# Patient Record
Sex: FEMALE
[De-identification: names, ages, dates, MRNs, and addresses within clinical notes are randomized per-mention and may not be internally consistent; named-entity substitution may affect disease eponyms.]

---

## 2018-01-03 ENCOUNTER — TRANSCRIPTION ENCOUNTER (OUTPATIENT)
Age: 23
End: 2018-01-03

## 2018-05-24 ENCOUNTER — TRANSCRIPTION ENCOUNTER (OUTPATIENT)
Age: 23
End: 2018-05-24

## 2018-06-13 PROBLEM — Z00.00 ENCOUNTER FOR PREVENTIVE HEALTH EXAMINATION: Status: ACTIVE | Noted: 2018-06-13

## 2018-06-14 ENCOUNTER — ASOB RESULT (OUTPATIENT)
Age: 23
End: 2018-06-14

## 2018-06-14 ENCOUNTER — APPOINTMENT (OUTPATIENT)
Dept: ANTEPARTUM | Facility: CLINIC | Age: 23
End: 2018-06-14
Payer: COMMERCIAL

## 2018-06-14 PROCEDURE — 76813 OB US NUCHAL MEAS 1 GEST: CPT

## 2018-08-16 ENCOUNTER — ASOB RESULT (OUTPATIENT)
Age: 23
End: 2018-08-16

## 2018-08-16 ENCOUNTER — APPOINTMENT (OUTPATIENT)
Dept: ANTEPARTUM | Facility: CLINIC | Age: 23
End: 2018-08-16
Payer: COMMERCIAL

## 2018-08-16 PROCEDURE — 76805 OB US >/= 14 WKS SNGL FETUS: CPT

## 2018-08-29 ENCOUNTER — ASOB RESULT (OUTPATIENT)
Age: 23
End: 2018-08-29

## 2018-08-29 ENCOUNTER — APPOINTMENT (OUTPATIENT)
Dept: ANTEPARTUM | Facility: CLINIC | Age: 23
End: 2018-08-29
Payer: COMMERCIAL

## 2018-08-29 PROCEDURE — 76816 OB US FOLLOW-UP PER FETUS: CPT

## 2018-10-10 ENCOUNTER — ASOB RESULT (OUTPATIENT)
Age: 23
End: 2018-10-10

## 2018-10-10 ENCOUNTER — APPOINTMENT (OUTPATIENT)
Dept: ANTEPARTUM | Facility: CLINIC | Age: 23
End: 2018-10-10
Payer: COMMERCIAL

## 2018-10-10 PROCEDURE — 76819 FETAL BIOPHYS PROFIL W/O NST: CPT

## 2018-10-10 PROCEDURE — 76816 OB US FOLLOW-UP PER FETUS: CPT

## 2018-11-28 ENCOUNTER — APPOINTMENT (OUTPATIENT)
Dept: ANTEPARTUM | Facility: CLINIC | Age: 23
End: 2018-11-28
Payer: COMMERCIAL

## 2018-11-28 ENCOUNTER — ASOB RESULT (OUTPATIENT)
Age: 23
End: 2018-11-28

## 2018-11-28 PROCEDURE — 76816 OB US FOLLOW-UP PER FETUS: CPT

## 2018-11-28 PROCEDURE — 76819 FETAL BIOPHYS PROFIL W/O NST: CPT

## 2018-12-27 ENCOUNTER — APPOINTMENT (OUTPATIENT)
Dept: MATERNAL FETAL MEDICINE | Facility: CLINIC | Age: 23
End: 2018-12-27

## 2018-12-27 ENCOUNTER — ASOB RESULT (OUTPATIENT)
Age: 23
End: 2018-12-27

## 2018-12-27 ENCOUNTER — APPOINTMENT (OUTPATIENT)
Dept: ANTEPARTUM | Facility: CLINIC | Age: 23
End: 2018-12-27
Payer: COMMERCIAL

## 2018-12-27 PROCEDURE — 76818 FETAL BIOPHYS PROFILE W/NST: CPT

## 2018-12-27 PROCEDURE — 76816 OB US FOLLOW-UP PER FETUS: CPT

## 2019-10-08 ENCOUNTER — TRANSCRIPTION ENCOUNTER (OUTPATIENT)
Age: 24
End: 2019-10-08

## 2019-10-21 ENCOUNTER — LABORATORY RESULT (OUTPATIENT)
Age: 24
End: 2019-10-21

## 2019-10-22 ENCOUNTER — APPOINTMENT (OUTPATIENT)
Dept: WOUND CARE | Facility: CLINIC | Age: 24
End: 2019-10-22
Payer: COMMERCIAL

## 2019-10-22 VITALS
RESPIRATION RATE: 16 BRPM | DIASTOLIC BLOOD PRESSURE: 68 MMHG | SYSTOLIC BLOOD PRESSURE: 107 MMHG | TEMPERATURE: 98 F | HEART RATE: 70 BPM

## 2019-10-22 DIAGNOSIS — Z80.8 FAMILY HISTORY OF MALIGNANT NEOPLASM OF OTHER ORGANS OR SYSTEMS: ICD-10-CM

## 2019-10-22 DIAGNOSIS — E03.9 HYPOTHYROIDISM, UNSPECIFIED: ICD-10-CM

## 2019-10-22 PROCEDURE — 99204 OFFICE O/P NEW MOD 45 MIN: CPT | Mod: 25

## 2019-10-22 PROCEDURE — 11042 DBRDMT SUBQ TIS 1ST 20SQCM/<: CPT

## 2019-10-22 RX ORDER — LIDOCAINE AND PRILOCAINE 25; 25 MG/G; MG/G
2.5-2.5 CREAM TOPICAL
Qty: 1 | Refills: 0 | Status: ACTIVE | COMMUNITY
Start: 2019-10-22 | End: 1900-01-01

## 2019-10-22 RX ORDER — LEVOTHYROXINE SODIUM 137 UG/1
TABLET ORAL
Refills: 0 | Status: ACTIVE | COMMUNITY

## 2019-10-22 RX ORDER — SODIUM HYPOCHLORITE 1.25 MG/ML
0.12 SOLUTION TOPICAL
Qty: 1 | Refills: 1 | Status: ACTIVE | COMMUNITY
Start: 2019-10-22 | End: 1900-01-01

## 2019-10-24 VITALS — HEIGHT: 71 IN | WEIGHT: 240 LBS | BODY MASS INDEX: 33.6 KG/M2

## 2019-10-24 RX ORDER — GENTAMICIN SULFATE 1 MG/G
0.1 CREAM TOPICAL DAILY
Qty: 1 | Refills: 2 | Status: ACTIVE | COMMUNITY
Start: 2019-10-24 | End: 1900-01-01

## 2019-11-05 ENCOUNTER — APPOINTMENT (OUTPATIENT)
Dept: WOUND CARE | Facility: CLINIC | Age: 24
End: 2019-11-05
Payer: COMMERCIAL

## 2019-11-05 PROCEDURE — 99213 OFFICE O/P EST LOW 20 MIN: CPT | Mod: 25

## 2019-11-05 PROCEDURE — 11042 DBRDMT SUBQ TIS 1ST 20SQCM/<: CPT | Mod: 58

## 2019-11-05 NOTE — HISTORY OF PRESENT ILLNESS
[FreeTextEntry1] : 24 yr old woman with arm wound\par left arm traumalocation  10/6/19 \par severity- no fevers\par timing/duration/ quality- antibiotics  oct 8 augmentin\par  urgent care 10/8 augmentin ibuprofen\par on oct 11 went to [facility]  iv antibioics for left arm arm swelling,
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
## 2019-11-22 NOTE — HISTORY OF PRESENT ILLNESS
[FreeTextEntry1] : 24 yr old woman with arm wound- doing much better with gent\par left arm traumalocation  10/6/19 \par severity- no fevers\par timing/duration/ quality- antibiotics  oct 8 augmentin\par  urgent care 10/8 augmentin ibuprofen\par on oct 11 went to [facility]  iv antibioics for left arm arm swelling,
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
## 2019-11-26 ENCOUNTER — APPOINTMENT (OUTPATIENT)
Dept: WOUND CARE | Facility: CLINIC | Age: 24
End: 2019-11-26
Payer: COMMERCIAL

## 2019-11-26 DIAGNOSIS — S41.112A LACERATION W/OUT FOREIGN BODY OF LEFT UPPER ARM, INITIAL ENCOUNTER: ICD-10-CM

## 2019-11-26 PROCEDURE — 99213 OFFICE O/P EST LOW 20 MIN: CPT

## 2019-12-05 PROBLEM — S41.112A LACERATION OF ARM, LEFT, COMPLICATED: Status: ACTIVE | Noted: 2019-10-24

## 2019-12-17 ENCOUNTER — APPOINTMENT (OUTPATIENT)
Dept: WOUND CARE | Facility: CLINIC | Age: 24
End: 2019-12-17
Payer: COMMERCIAL

## 2019-12-17 VITALS
SYSTOLIC BLOOD PRESSURE: 110 MMHG | TEMPERATURE: 98.4 F | RESPIRATION RATE: 16 BRPM | DIASTOLIC BLOOD PRESSURE: 60 MMHG | HEART RATE: 70 BPM

## 2019-12-17 DIAGNOSIS — Z78.9 OTHER SPECIFIED HEALTH STATUS: ICD-10-CM
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
## 2019-12-17 PROCEDURE — 99213 OFFICE O/P EST LOW 20 MIN: CPT

## 2019-12-17 NOTE — ASSESSMENT
[FreeTextEntry1] :  \par  24 yr old woman  with left forearm traumatic wound, non healing, s/p cellulitis\par  \par left posterior forearm wound treating with gentamicin and dakins, improving.\par complex- low-lab, xr emelina,test reviewed\par risk-  low\par Plan - c/w dakins, gentamicin, padma , ace wrap

## 2019-12-17 NOTE — HISTORY OF PRESENT ILLNESS
[FreeTextEntry1] : 24 yr old woman with arm wound- doing much better with gent\par left arm traumalocation  10/6/19 - doing vitamins,\par had it close and it blistered and popped\par severity- no fevers\par timing/duration/ quality- antibiotics  oct 8 augmentin\par  urgent care 10/8 augmentin ibuprofen\par on oct 11 went to [facility]  iv antibioics for left arm arm swelling,
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
## 2019-12-19 PROBLEM — Z78.9 NON-SMOKER: Status: ACTIVE | Noted: 2019-10-22
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
## 2019-12-19 NOTE — ASSESSMENT
[FreeTextEntry1] :  \par  24 yr old woman  with left forearm traumatic wound, non healing, s/p cellulitis\par  \par left posterior forearm wound closed\par complex- low-lab, xr emelina,test reviewed\par risk-  low\par Plan - c/w  ace wrap, heat for muscle ache beneath scar

## 2019-12-19 NOTE — PHYSICAL EXAM
[JVD] : no jugular venous distention  [Normal Breath Sounds] : Normal breath sounds [Normal Rate and Rhythm] : normal rate and rhythm [2+] : left 2+ [Skin Induration] : induration [Abdomen Tenderness] : ~T ~M No abdominal tenderness [Skin Ulcer] : ulcer [Alert] : alert [Oriented to Person] : oriented to person [Oriented to Time] : oriented to time [Oriented to Place] : oriented to place [Calm] : calm [de-identified] : normal [de-identified] : nad [de-identified] : normal [de-identified] : rom intact wrist arm legs [FreeTextEntry2] : 0 [FreeTextEntry1] : left arm laceration/eschar from forearm bite  [4 x 4] : 4 x 4  [FreeTextEntry4] : 0 [FreeTextEntry3] : 0 [de-identified] : left wrist 18\par right wrist 18\par previous 1.9/2.3/0.2\par \par 1.4/1.6/0.2 [TWNoteComboBox1] : Left [de-identified] : gentmycin /dakins wash [TWNoteComboBox5] : No [TWNoteComboBox4] : Small [TWNoteComboBox6] : Traumatic [de-identified] : No [de-identified] : Normal [de-identified] : Mild [de-identified] : Yes [TWNoteComboBox7] : Mechanical [de-identified] : Ace wraps [de-identified] : Primary Dressing [de-identified] : Daily

## 2019-12-19 NOTE — HISTORY OF PRESENT ILLNESS
[FreeTextEntry1] : 24 yr old woman with arm wound- doing much better with gent- now closed\par left arm traumalocation  10/6/19 - doing vitamins,\par had it close and it blistered and popped\par severity- no fevers\par timing/duration/ quality- antibiotics  oct 8 augmentin\par  urgent care 10/8 augmentin ibuprofen\par on oct 11 went to [facility]  iv antibioics for left arm arm swelling,

## 2021-11-18 ENCOUNTER — TRANSCRIPTION ENCOUNTER (OUTPATIENT)
Age: 26
End: 2021-11-18

## 2022-03-05 ENCOUNTER — TRANSCRIPTION ENCOUNTER (OUTPATIENT)
Age: 27
End: 2022-03-05

## 2022-05-12 ENCOUNTER — NON-APPOINTMENT (OUTPATIENT)
Age: 27
End: 2022-05-12

## 2022-07-17 ENCOUNTER — NON-APPOINTMENT (OUTPATIENT)
Age: 27
End: 2022-07-17

## 2022-07-25 ENCOUNTER — NON-APPOINTMENT (OUTPATIENT)
Age: 27
End: 2022-07-25

## 2022-10-10 ENCOUNTER — NON-APPOINTMENT (OUTPATIENT)
Age: 27
End: 2022-10-10

## 2022-12-20 ENCOUNTER — NON-APPOINTMENT (OUTPATIENT)
Age: 27
End: 2022-12-20

## 2023-07-30 ENCOUNTER — NON-APPOINTMENT (OUTPATIENT)
Age: 28
End: 2023-07-30

## 2024-01-21 ENCOUNTER — NON-APPOINTMENT (OUTPATIENT)
Age: 29
End: 2024-01-21

## 2024-08-03 ENCOUNTER — NON-APPOINTMENT (OUTPATIENT)
Age: 29
End: 2024-08-03

## 2025-01-04 ENCOUNTER — NON-APPOINTMENT (OUTPATIENT)
Age: 30
End: 2025-01-04

## 2025-04-14 ENCOUNTER — NON-APPOINTMENT (OUTPATIENT)
Age: 30
End: 2025-04-14